# Patient Record
Sex: FEMALE | Race: AMERICAN INDIAN OR ALASKA NATIVE
[De-identification: names, ages, dates, MRNs, and addresses within clinical notes are randomized per-mention and may not be internally consistent; named-entity substitution may affect disease eponyms.]

---

## 2017-05-31 ENCOUNTER — HOSPITAL ENCOUNTER (EMERGENCY)
Dept: HOSPITAL 5 - ED | Age: 32
Discharge: HOME | End: 2017-05-31
Payer: MEDICARE

## 2017-05-31 VITALS — DIASTOLIC BLOOD PRESSURE: 55 MMHG | SYSTOLIC BLOOD PRESSURE: 96 MMHG

## 2017-05-31 DIAGNOSIS — F17.200: ICD-10-CM

## 2017-05-31 DIAGNOSIS — N30.00: ICD-10-CM

## 2017-05-31 DIAGNOSIS — R55: Primary | ICD-10-CM

## 2017-05-31 LAB
ALBUMIN SERPL-MCNC: 4.2 G/DL (ref 3.9–5)
ALBUMIN/GLOB SERPL: 1.3 %
ALP SERPL-CCNC: 81 UNITS/L (ref 35–129)
ALT SERPL-CCNC: 14 UNITS/L (ref 7–56)
ANION GAP SERPL CALC-SCNC: 21 MMOL/L
BACTERIA #/AREA URNS HPF: (no result) /HPF
BASOPHILS NFR BLD AUTO: 1.2 % (ref 0–1.8)
BILIRUB SERPL-MCNC: 0.2 MG/DL (ref 0.1–1.2)
BILIRUB UR QL STRIP: (no result)
BLOOD UR QL VISUAL: (no result)
BUN SERPL-MCNC: 11 MG/DL (ref 7–17)
BUN/CREAT SERPL: 12.22 %
CALCIUM SERPL-MCNC: 9.2 MG/DL (ref 8.4–10.2)
CHLORIDE SERPL-SCNC: 102.2 MMOL/L (ref 98–107)
CO2 SERPL-SCNC: 22 MMOL/L (ref 22–30)
EOSINOPHIL NFR BLD AUTO: 1.7 % (ref 0–4.3)
GLUCOSE SERPL-MCNC: 75 MG/DL (ref 65–100)
HCT VFR BLD CALC: 38.7 % (ref 30.3–42.9)
HGB BLD-MCNC: 12.3 GM/DL (ref 10.1–14.3)
KETONES UR STRIP-MCNC: (no result) MG/DL
LEUKOCYTE ESTERASE UR QL STRIP: (no result)
MCH RBC QN AUTO: 28 PG (ref 28–32)
MCHC RBC AUTO-ENTMCNC: 32 % (ref 30–34)
MCV RBC AUTO: 86 FL (ref 79–97)
MUCOUS THREADS #/AREA URNS HPF: (no result) /HPF
NITRITE UR QL STRIP: (no result)
PH UR STRIP: 6 [PH] (ref 5–7)
PLATELET # BLD: 279 K/MM3 (ref 140–440)
POTASSIUM SERPL-SCNC: 3.7 MMOL/L (ref 3.6–5)
PROT SERPL-MCNC: 7.5 G/DL (ref 6.3–8.2)
PROT UR STRIP-MCNC: (no result) MG/DL
RBC # BLD AUTO: 4.48 M/MM3 (ref 3.65–5.03)
RBC #/AREA URNS HPF: 1 /HPF (ref 0–6)
SODIUM SERPL-SCNC: 141 MMOL/L (ref 137–145)
URINE DRUGS OF ABUSE NOTE: (no result)
UROBILINOGEN UR-MCNC: < 2 MG/DL (ref ?–2)
WBC # BLD AUTO: 7.1 K/MM3 (ref 4.5–11)
WBC #/AREA URNS HPF: 21 /HPF (ref 0–6)

## 2017-05-31 PROCEDURE — 84703 CHORIONIC GONADOTROPIN ASSAY: CPT

## 2017-05-31 PROCEDURE — 84443 ASSAY THYROID STIM HORMONE: CPT

## 2017-05-31 PROCEDURE — 96374 THER/PROPH/DIAG INJ IV PUSH: CPT

## 2017-05-31 PROCEDURE — 85025 COMPLETE CBC W/AUTO DIFF WBC: CPT

## 2017-05-31 PROCEDURE — 99284 EMERGENCY DEPT VISIT MOD MDM: CPT

## 2017-05-31 PROCEDURE — 93010 ELECTROCARDIOGRAM REPORT: CPT

## 2017-05-31 PROCEDURE — 81001 URINALYSIS AUTO W/SCOPE: CPT

## 2017-05-31 PROCEDURE — 80307 DRUG TEST PRSMV CHEM ANLYZR: CPT

## 2017-05-31 PROCEDURE — 80053 COMPREHEN METABOLIC PANEL: CPT

## 2017-05-31 PROCEDURE — 93005 ELECTROCARDIOGRAM TRACING: CPT

## 2017-05-31 PROCEDURE — 36415 COLL VENOUS BLD VENIPUNCTURE: CPT

## 2017-05-31 PROCEDURE — 84484 ASSAY OF TROPONIN QUANT: CPT

## 2017-05-31 NOTE — EMERGENCY DEPARTMENT REPORT
HPI





- General


Chief Complaint: Syncope


Time Seen by Provider: 05/31/17 18:49





- HPI


HPI: 


This is a 31-year-old Afro-American female presents to the emergency department 

by EMS from home after she has what appears to be a syncopal episode.  She was 

on her way to the bathroom when her  saw her go down unconscious.  

Unknown time unconscious.  There is no report of any seizure like activity.  

The patient denies any illicit drug use or any other medication taken prior to 

this event.  She currently denies any physical complaints other than some 

chronic dental pain.  Her primary care physician is Dr. Gordon.  She denies any 

past medical history.  She has some psychiatric history but says that that is 

not an issue for right now and denies any suicidal or homicidal ideations or 

attempts.  No recent travel or sick contacts at home.








ED Past Medical Hx





- Past Medical History


Hx Hypertension: No


Hx Heart Attack/AMI: No


Hx Congestive Heart Failure: No


Hx Diabetes: No


Hx Deep Vein Thrombosis: No


Hx Pulmonary Embolism: No


Hx Liver Disease: No


Hx Renal Disease: No


Hx Sickle Cell Disease: No


Hx Arthritis: No


Hx Seizures: No


Hx Kidney Stones: No


Hx COPD: No


Hx Tuberculosis: No


Hx Dementia: No


Hx HIV: No


Additional medical history: hx pancreatitis, anemia.  Anxiety





- Surgical History


Hx Coronary Stent: No


Hx Pacemaker: No


Hx Internal Defibrillator: No


Additional Surgical History: csearean x 3, vag mesh surg x 6, back surg; 

hysterectomy 2015





- Social History


Smoking Status: Current Some Day Smoker


Substance Use Type: None





- Medications


Home Medications: 


 Home Medications











 Medication  Instructions  Recorded  Confirmed  Last Taken  Type


 


Acetaminophen/Codeine [Tylenol #3] 1 tab PO Q6H PRN #12 tab 08/15/16  Unknown Rx


 


Penicillin Vk [Veetids TAB] 500 mg PO QID #40 tablet 08/15/16  Unknown Rx


 


Tobramycin 0.3% [Tobrex] 1 drop OD Q8HR #1 bottle 08/15/16  Unknown Rx


 


Nitrofurantoin Mono/M-Cryst 100 mg PO Q12HR #14 capsule 05/31/17  Unknown Rx





[Macrobid CAP]     














ED Review of Systems


ROS: 


Stated complaint: SYNCOPY


Other details as noted in HPI





Comment: All other systems reviewed and negative


Constitutional: denies: chills, fever


Eyes: denies: eye pain, eye discharge, vision change


ENT: denies: ear pain, throat pain


Respiratory: denies: cough, shortness of breath, wheezing


Cardiovascular: syncope.  denies: chest pain


Gastrointestinal: denies: abdominal pain, diarrhea


Genitourinary: denies: urgency, dysuria, discharge


Musculoskeletal: denies: back pain, joint swelling, arthralgia


Skin: denies: rash, lesions


Neurological: denies: headache, weakness, paresthesias





Physical Exam





- Physical Exam


Vital Signs: 


 Vital Signs











  05/31/17 05/31/17





  18:31 18:37


 


Temperature  98.0 F


 


Pulse Rate 78 83


 


Respiratory 16 13





Rate  


 


Blood Pressure 100/61 


 


Blood Pressure  96/54





[Right]  


 


O2 Sat by Pulse 100 100





Oximetry  











Physical Exam: 


GENERAL: The patient is well-developed well-nourished.


HEENT: Normocephalic.  Atraumatic.  Extraocular motions are intact.  Patient 

has moist mucous membranes.  Pupils equal reactive to light bilaterally.  No 

nystagmus.


NECK: Supple.  Trachea is midline.


CHEST/LUNGS: Clear to auscultation.  There is no respiratory distress noted.


HEART/CARDIOVASCULAR: Regular.  There is no tachycardia.  There is no gallop 

rub or murmur.


ABDOMEN: Abdomen is soft, nontender.  Patient has normal bowel sounds.  There 

is no abdominal distention.


SKIN: Skin is warm and dry.


NEURO: The patient is awake, alert, and oriented.  The patient is cooperative.  

The patient has no focal neurologic deficits.  The patient has normal speech.  

Cranial nerves II through XII grossly intact.  No dysmetria.  No pronator drift.


MUSCULOSKELETAL: There is no tenderness or deformity.  There is no limitation 

range of motion.  There is no evidence of acute injury.  Muscle strength 5 out 

of 5 upper and lower extremities bilaterally.








ED Course


 Vital Signs











  05/31/17 05/31/17





  18:31 18:37


 


Temperature  98.0 F


 


Pulse Rate 78 83


 


Respiratory 16 13





Rate  


 


Blood Pressure 100/61 


 


Blood Pressure  96/54





[Right]  


 


O2 Sat by Pulse 100 100





Oximetry  














ED Medical Decision Making





- Lab Data


Result diagrams: 


 05/31/17 19:15





 05/31/17 19:15





- EKG Data


-: EKG Interpreted by Me


EKG shows normal: sinus rhythm, axis, intervals, QRS complexes, ST-T waves


Rate: normal





- EKG Data


When compared to previous EKG there are: previous EKG unavailable


Interpretation: normal EKG





- Medical Decision Making


This is a 31-year-old female presents to the emergency department after she had 

a syncopal episode at home.  She was evaluated with physical exam, labs and 

EKG.  EKG is normal without ST elevation MI, dysrhythmia or ischemia.  Vital 

signs stable throughout her ED course.  Physical exam does not show any focal, 

motor or sensory deficits and she has normal cranial nerves.  Labs are mostly 

unremarkable and do not show any etiology of the patient's symptoms.  Urine 

drug screen is positive for benzodiazepine and marijuana.  The patient is on 

benzodiazepine as treatment for her anxiety.  There were 21 white blood cells 

found in the urine so the patient will be placed on a course of Macrobid for a 

mild urinary tract infection.  She was seen ambulatory in the emergency 

department and appeared stable.  As this was the first syncopal episode in a 

long time and there was no further episodes of passing out or any seizure-like 

activity and there are no neurological deficits, did not feel that the patient 

required CT imaging of the head at this time.  She'll be discharged home to 

follow-up with her primary care doctor, Dr. Gordon, but she has been encouraged 

to return to the emergency department with any further episodes or any acute 

distress.  She understands and agrees to plan.








- Differential Diagnosis


vasovagal, orthostatic hypotension, seizure, substance abuse


Critical Care Time: No


Critical care attestation.: 


If time is entered above; I have spent that time in minutes in the direct care 

of this critically ill patient, excluding procedure time.








ED Disposition


Clinical Impression: 


Syncope


Qualifiers:


 Syncope type: unspecified Qualified Code(s): R55 - Syncope and collapse





UTI (urinary tract infection)


Qualifiers:


 Urinary tract infection type: acute cystitis Hematuria presence: without 

hematuria Qualified Code(s): N30.00 - Acute cystitis without hematuria





Disposition: DISCHARGED TO HOME OR SELFCARE


Is pt being admited?: No


Condition: Stable


Instructions:  Urinary Tract Infection in Women (ED), Syncope (ED)


Additional Instructions: 


Please follow-up with your primary care doctor in the next few days.  Return to 

the emergency department with any further episodes of passing out, any seizure-

like activity, or any acute distress.


Prescriptions: 


Nitrofurantoin Mono/M-Cryst [Macrobid CAP] 100 mg PO Q12HR #14 capsule


Referrals: 


LAENY GORDON MD [Referring] - ASAP


Time of Disposition: 22:17

## 2018-05-15 ENCOUNTER — HOSPITAL ENCOUNTER (EMERGENCY)
Dept: HOSPITAL 5 - ED | Age: 33
Discharge: HOME | End: 2018-05-15
Payer: MEDICARE

## 2018-05-15 VITALS — DIASTOLIC BLOOD PRESSURE: 74 MMHG | SYSTOLIC BLOOD PRESSURE: 99 MMHG

## 2018-05-15 DIAGNOSIS — F17.200: ICD-10-CM

## 2018-05-15 DIAGNOSIS — Z86.2: ICD-10-CM

## 2018-05-15 DIAGNOSIS — F41.9: ICD-10-CM

## 2018-05-15 DIAGNOSIS — L30.8: Primary | ICD-10-CM

## 2018-05-15 PROCEDURE — 90471 IMMUNIZATION ADMIN: CPT

## 2018-05-15 PROCEDURE — 90715 TDAP VACCINE 7 YRS/> IM: CPT

## 2018-05-15 PROCEDURE — 99283 EMERGENCY DEPT VISIT LOW MDM: CPT

## 2018-05-15 NOTE — EMERGENCY DEPARTMENT REPORT
ED General Adult HPI





- General


Chief complaint: Skin Rash


Stated complaint: RASH ON CHEST


Time Seen by Provider: 05/15/18 10:50


Source: patient, EMS


Mode of arrival: Stretcher


Limitations: No Limitations





- History of Present Illness


Initial comments: 





32-year-old female history of itchy red rash for 2 days she's not sure if she 

had a contact reaction, she also states she was a heating pad last night and 

now has a blister to the right volar aspect of her arm with a small blister.  

There is no redness or warmth she does have full motion no tightness to the 

skin with soft compartments she is here for 2 things a small partial thickness 

burn less than 1% to the right forearm on the volar aspect #1 #2, a dermatitis 

on under her bilateral breast and also on the chest wall,.  No fever no 

shortness of breath no abdominal complaints.  Past HISTORY significant for 

vasovagal spells.  She also has a history of UTIs.  She denies new medication.  

She denies no detergent.  She shortness of breath no stridor or drooling no 

fever no chest pain no abdominal pain no missed periods no dysuria no back 

pain.  She does have bilateral erythematous macular rash


-: Gradual, hour(s)


Location: chest, right, upper extremity


Radiation: non-radiation


Severity scale (0 -10): 2


Associated Symptoms: denies other symptoms, rash.  denies: chest pain, cough, 

diaphoresis, fever/chills, malaise, nausea/vomiting, seizure, shortness of 

breath, syncope





- Related Data


 Previous Rx's











 Medication  Instructions  Recorded  Last Taken  Type


 


Acetaminophen/Codeine [Tylenol #3] 1 tab PO Q6H PRN #12 tab 08/15/16 Unknown Rx


 


Penicillin Vk [Veetids TAB] 500 mg PO QID #40 tablet 08/15/16 Unknown Rx


 


Tobramycin 0.3% [Tobrex] 1 drop OD Q8HR #1 bottle 08/15/16 Unknown Rx


 


Nitrofurantoin Mono/M-Cryst 100 mg PO Q12HR #14 capsule 05/31/17 Unknown Rx





[Macrobid CAP]    


 


Nystatin 15 gm TP BID #1 cream..g. 05/15/18 Unknown Rx


 


diphenhydrAMINE [Benadryl] 25 mg IV Q6HR PRN #20 vial 05/15/18 Unknown Rx


 


predniSONE [Deltasone] 50 mg PO QDAY 14 Days #11 tab 05/15/18 Unknown Rx











 Allergies











Allergy/AdvReac Type Severity Reaction Status Date / Time


 


No Known Allergies Allergy   Verified 05/15/18 07:14














ED Review of Systems


ROS: 


Stated complaint: RASH ON CHEST


Other details as noted in HPI





Comment: All other systems reviewed and negative


Constitutional: denies: diaphoresis, fever, malaise


Eyes: denies: eye discharge, vision change


ENT: denies: dental pain, hearing loss, epistaxis


Respiratory: denies: cough, orthopnea, shortness of breath, SOB with exertion, 

SOB at rest, stridor, wheezing


Cardiovascular: denies: chest pain, palpitations, dyspnea on exertion, orthopnea

, edema, syncope, paroxysmal nocturnal dyspnea


Gastrointestinal: denies: abdominal pain, nausea, vomiting, diarrhea, 

constipation, hematemesis, melena, hematochezia


Skin: rash, pruritus


Neurological: denies: headache, weakness, numbness, paresthesias, confusion, 

vertigo





ED Past Medical Hx





- Past Medical History


Hx Hypertension: No


Hx Heart Attack/AMI: No


Hx Congestive Heart Failure: No


Hx Diabetes: No


Hx Deep Vein Thrombosis: No


Hx Pulmonary Embolism: No


Hx Liver Disease: No


Hx Renal Disease: No


Hx Sickle Cell Disease: No


Hx Arthritis: Yes


Hx Seizures: No


Hx Kidney Stones: No


Hx COPD: No


Hx Tuberculosis: No


Hx Dementia: No


Hx HIV: No


Additional medical history: hx pancreatitis, anemia.  Anxiety





- Surgical History


Hx Coronary Stent: No


Hx Pacemaker: No


Hx Internal Defibrillator: No


Additional Surgical History: csearean x 3, vag mesh surg x 6, back surg; 

hysterectomy 2015





- Social History


Smoking Status: Current Every Day Smoker


Substance Use Type: None





- Medications


Home Medications: 


 Home Medications











 Medication  Instructions  Recorded  Confirmed  Last Taken  Type


 


Acetaminophen/Codeine [Tylenol #3] 1 tab PO Q6H PRN #12 tab 08/15/16  Unknown Rx


 


Penicillin Vk [Veetids TAB] 500 mg PO QID #40 tablet 08/15/16  Unknown Rx


 


Tobramycin 0.3% [Tobrex] 1 drop OD Q8HR #1 bottle 08/15/16  Unknown Rx


 


Nitrofurantoin Mono/M-Cryst 100 mg PO Q12HR #14 capsule 05/31/17  Unknown Rx





[Macrobid CAP]     


 


Nystatin 15 gm TP BID #1 cream..g. 05/15/18  Unknown Rx


 


diphenhydrAMINE [Benadryl] 25 mg IV Q6HR PRN #20 vial 05/15/18  Unknown Rx


 


predniSONE [Deltasone] 50 mg PO QDAY 14 Days #11 tab 05/15/18  Unknown Rx














ED Physical Exam





- General


Limitations: No Limitations


General appearance: alert, in no apparent distress, anxious





- Head


Head exam: Present: atraumatic, normocephalic





- Eye


Eye exam: Present: normal appearance, PERRL, EOMI





- ENT


ENT exam: Present: normal exam, normal orophraynx





- Neck


Neck exam: Present: normal inspection.  Absent: tenderness, meningismus





- Respiratory


Respiratory exam: Present: normal lung sounds bilaterally.  Absent: respiratory 

distress, wheezes, rales, rhonchi, stridor





- Cardiovascular


Cardiovascular Exam: Present: regular rate, normal rhythm





- GI/Abdominal


GI/Abdominal exam: Present: soft.  Absent: distended, tenderness, guarding, 

rebound, rigid, pulsatile mass





- Extremities Exam


Extremities exam: Present: normal inspection, other (1 cm less than 1% body 

surface area partial thickness burn right forearmcellulitis no compartment 

syndrome forward range of motion neurovascularly intact)





- Neurological Exam


Neurological exam: Present: alert, oriented X3, CN II-XII intact.  Absent: 

motor sensory deficit





- Skin


Skin exam: Present: erythema, other (erythematous bilateral macular rash under 

bilateral breast on the chest wall without cellulitis without soft tissue gas 

lungs are clear auscultation pulses equal bilaterally)





ED Course





 Vital Signs











  05/15/18 05/15/18 05/15/18





  07:07 07:20 08:21


 


Temperature  97.5 F L 


 


Pulse Rate  64 


 


Respiratory 16 16 17





Rate   


 


Blood Pressure  116/50 





[Left]   


 


O2 Sat by Pulse 100 100 





Oximetry   














  05/15/18





  09:24


 


Temperature 98.0 F


 


Pulse Rate 74


 


Respiratory 18





Rate 


 


Blood Pressure 122/61





[Left] 


 


O2 Sat by Pulse 98





Oximetry 














ED Medical Decision Making





- Medical Decision Making





Patient will be given burn cream, no evidence of cellulitis no evidence of deep 

burn, she was updated on tetanus, she also has a contact-like reaction to the 

chest wall and intertriginous areas, she will be placed on nystatin there may 

also be a contact reaction to which she will be given a prednisone as well as 

Benadryl for itch she is still for outpatient follow-up complications were 

noted at this time she will need dermatology follow-up for scraping of 

persistent and/or follow-up she was verbalized understanding 1 I discussed this 

with her.





- Differential Diagnosis


contact dermatitis [place] infection burn


Critical care attestation.: 


If time is entered above; I have spent that time in minutes in the direct care 

of this critically ill patient, excluding procedure time.








ED Disposition


Clinical Impression: 


 Dermatitis, Partial thickness burn





Disposition: DC-01 TO HOME OR SELFCARE


Is pt being admited?: No


Condition: Stable


Instructions:  Partial Thickness Burn (ED), Contact Dermatitis (ED)


Additional Instructions: 


Return if new or alarming symptoms, follow-up and dermatologist your choice in 

2 days, see the doctor listed very regular doctor in 2 days


Prescriptions: 


diphenhydrAMINE [Benadryl] 25 mg IV Q6HR PRN #20 vial


 PRN Reason: Itching


Nystatin 15 gm TP BID #1 cream..g.


predniSONE [Deltasone] 50 mg PO QDAY 14 Days #11 tab


Referrals: 


PRIMARY CARE,MD [Primary Care Provider] - 3-5 Days


CARMELO DAN MD [Staff Physician] - 3-5 Days


Time of Disposition: 11:10

## 2018-08-29 ENCOUNTER — HOSPITAL ENCOUNTER (EMERGENCY)
Dept: HOSPITAL 5 - ED | Age: 33
Discharge: HOME | End: 2018-08-29
Payer: MEDICARE

## 2018-08-29 VITALS — DIASTOLIC BLOOD PRESSURE: 66 MMHG | SYSTOLIC BLOOD PRESSURE: 104 MMHG

## 2018-08-29 DIAGNOSIS — F41.9: ICD-10-CM
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
## 2018-08-29 DIAGNOSIS — F17.200: ICD-10-CM
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
## 2018-08-29 DIAGNOSIS — M19.90: ICD-10-CM

## 2018-08-29 DIAGNOSIS — Z86.2: ICD-10-CM

## 2018-08-29 DIAGNOSIS — S82.832A: Primary | ICD-10-CM

## 2018-08-29 DIAGNOSIS — Z90.710: ICD-10-CM

## 2018-08-29 NOTE — XRAY REPORT
FINAL REPORT



EXAM:  XR ANKLE 3+V LT



HISTORY:  trauma pain  



TECHNIQUE:  AP, lateral, and oblique views of the left ankle



PRIORS:  None.



FINDINGS:  

There is an acute oblique minimally displaced comminuted fracture

involving the distal fibula. The fracture line does extend to the

distal articular surface at the distal tibiofibular joint.

Overlying soft tissue swelling is seen. 



There is no evidence for dislocation.  No radiopaque foreign

bodies are seen.  The ankle mortise is intact.  Bony

mineralization is normal and joint spaces are maintained.



IMPRESSION:  

Minimally displaced acute oblique comminuted fracture involving

the distal fibula.

## 2018-08-29 NOTE — EMERGENCY DEPARTMENT REPORT
ED Lower Extremity HPI





- General


Chief Complaint: Extremity Injury, Lower


Stated Complaint: LEFT ANKLE PAIN


Time Seen by Provider: 08/29/18 17:10


Source: patient


Mode of arrival: Wheelchair


Limitations: Physical Limitation





- History of Present Illness


Initial Comments: 





This is a 32-year-old female nontoxic, well nourished in appearance, no acute 

signs of distress presents to the ED with c/o of left knee pain 1 day.  

Patient stated that she tripped and twisted her ankle.  Patient denies any 

other trauma.  Patient denies any numbness, tingling, fever, chills, nausea, 

vomiting, chest pain, shortness of breath, headache, stiff neck.  Patient 

denies any joint swelling or joint redness.  Patient denies decreased range of 

motion.  Patient stated has decreased gait due to pain.  Patient denies any 

allergies or significant past medical history.


MD Complaint: ankle injury


-: days(s) (1)


Injury: Ankle: Left


Type of Injury: inversion


Place: home


Severity: mild


Severity scale (0 -10): 8


Improves With: immobilization


Worsens With: weight bearing, movement, palpation


Associated Symptoms: swelling, unable to bear weight.  denies: snap/pop 

sensation, numbness, tingling, able to partially bear weight, ambulatory





- Related Data


 Previous Rx's











 Medication  Instructions  Recorded  Last Taken  Type


 


Acetaminophen/Codeine [Tylenol #3] 1 tab PO Q6H PRN #12 tab 08/15/16 Unknown Rx


 


Penicillin Vk [Veetids TAB] 500 mg PO QID #40 tablet 08/15/16 Unknown Rx


 


Tobramycin 0.3% [Tobrex] 1 drop OD Q8HR #1 bottle 08/15/16 Unknown Rx


 


Nitrofurantoin Mono/M-Cryst 100 mg PO Q12HR #14 capsule 05/31/17 Unknown Rx





[Macrobid CAP]    


 


Nystatin 15 gm TP BID #1 cream..g. 05/15/18 Unknown Rx


 


diphenhydrAMINE [Benadryl] 25 mg IV Q6HR PRN #20 vial 05/15/18 Unknown Rx


 


predniSONE [Deltasone] 50 mg PO QDAY 14 Days #11 tab 05/15/18 Unknown Rx


 


Acetaminophen/Codeine [Tylenol 1 tab PO Q6H PRN #12 tab 08/29/18 Unknown Rx





/Codeine # 3 tab]    











 Allergies











Allergy/AdvReac Type Severity Reaction Status Date / Time


 


No Known Allergies Allergy   Verified 05/15/18 07:14














ED Review of Systems


ROS: 


Stated complaint: LEFT ANKLE PAIN


Other details as noted in HPI





Constitutional: denies: chills, fever


Eyes: denies: eye pain, eye discharge, vision change


ENT: denies: ear pain, throat pain


Respiratory: denies: cough, shortness of breath, wheezing


Cardiovascular: denies: chest pain, palpitations


Endocrine: no symptoms reported


Gastrointestinal: denies: abdominal pain, nausea, diarrhea


Genitourinary: denies: urgency, dysuria, discharge


Musculoskeletal: denies: back pain, joint swelling, arthralgia


Skin: denies: rash, lesions


Neurological: denies: headache, weakness, paresthesias


Psychiatric: denies: anxiety, depression


Hematological/Lymphatic: denies: easy bleeding, easy bruising





ED Past Medical Hx





- Past Medical History


Previous Medical History?: Yes


Hx Hypertension: No


Hx Heart Attack/AMI: No


Hx Congestive Heart Failure: No


Hx Diabetes: No


Hx Deep Vein Thrombosis: No


Hx Pulmonary Embolism: No


Hx Liver Disease: No


Hx Renal Disease: No


Hx Sickle Cell Disease: No


Hx Arthritis: Yes


Hx Seizures: No


Hx Kidney Stones: No


Hx COPD: No


Hx Tuberculosis: No


Hx Dementia: No


Hx HIV: No


Additional medical history: hx pancreatitis, anemia.  Anxiety





- Surgical History


Past Surgical History?: Yes


Hx Coronary Stent: No


Hx Pacemaker: No


Hx Internal Defibrillator: No


Additional Surgical History: csearean x 3, vag mesh surg x 6, back surg; 

hysterectomy 2015





- Social History


Smoking Status: Current Every Day Smoker


Substance Use Type: None





- Medications


Home Medications: 


 Home Medications











 Medication  Instructions  Recorded  Confirmed  Last Taken  Type


 


Acetaminophen/Codeine [Tylenol #3] 1 tab PO Q6H PRN #12 tab 08/15/16  Unknown Rx


 


Penicillin Vk [Veetids TAB] 500 mg PO QID #40 tablet 08/15/16  Unknown Rx


 


Tobramycin 0.3% [Tobrex] 1 drop OD Q8HR #1 bottle 08/15/16  Unknown Rx


 


Nitrofurantoin Mono/M-Cryst 100 mg PO Q12HR #14 capsule 05/31/17  Unknown Rx





[Macrobid CAP]     


 


Nystatin 15 gm TP BID #1 cream..g. 05/15/18  Unknown Rx


 


diphenhydrAMINE [Benadryl] 25 mg IV Q6HR PRN #20 vial 05/15/18  Unknown Rx


 


predniSONE [Deltasone] 50 mg PO QDAY 14 Days #11 tab 05/15/18  Unknown Rx


 


Acetaminophen/Codeine [Tylenol 1 tab PO Q6H PRN #12 tab 08/29/18  Unknown Rx





/Codeine # 3 tab]     














ED Physical Exam





- General


Limitations: Physical Limitation


General appearance: alert, in no apparent distress





- Head


Head exam: Present: atraumatic, normocephalic





- Eye


Eye exam: Present: normal appearance


Pupils: Present: normal accommodation





- ENT


ENT exam: Present: normal exam, mucous membranes moist





- Neck


Neck exam: Present: normal inspection, full ROM.  Absent: tenderness, 

meningismus, lymphadenopathy





- Respiratory


Respiratory exam: Present: normal lung sounds bilaterally.  Absent: respiratory 

distress, wheezes, rales, rhonchi, stridor, chest wall tenderness, accessory 

muscle use, decreased breath sounds, prolonged expiratory





- Cardiovascular


Cardiovascular Exam: Present: regular rate, normal rhythm, normal heart sounds.

  Absent: irregular rhythm, systolic murmur, diastolic murmur, rubs, gallop





- GI/Abdominal


GI/Abdominal exam: Present: soft, normal bowel sounds.  Absent: distended, 

tenderness, guarding, rebound, rigid, diminished bowel sounds





- Rectal


Rectal exam: Present: deferred





- Extremities Exam


Extremities exam: Present: normal inspection, full ROM, tenderness, normal 

capillary refill.  Absent: joint swelling





- Expanded Lower Extremity Exam


  ** Left


Hip exam: Present: normal inspection, full ROM.  Absent: tenderness, swelling


Upper Leg exam: Present: normal inspection, full ROM.  Absent: tenderness, 

swelling


Knee exam: Present: normal inspection, full ROM.  Absent: tenderness, swelling


Lower Leg exam: Present: normal inspection, full ROM.  Absent: tenderness, 

swelling


Ankle exam: Present: normal inspection, full ROM, tenderness, swelling, 

ecchymosis.  Absent: abrasion, laceration, deformity, crepidus, dislocation, 

erythema, anterior draw sign


Foot/Toe exam: Present: normal inspection, full ROM.  Absent: tenderness, 

swelling, abrasion, laceration, ecchymosis, deformity, crepidus, dislocation, 

erythema, amputation, puncture wound, foreign body, calcaneal tenderness, 

tenderness at base of 5th metatarsal, nail avulsion, subungual hematoma


Neuro vascular tendon exam: Present: no vascular compromise.  Absent: pulse 

deficit, abnormal cap refill, motor deficit, sensory deficit, tendon deficit, 

extremity cold to touch, pallor, abnormal 2-point discrimination, decreased fine

/light touch, foot drop, peroneal nerve deficit, significant pain with passive 

ROM of distal joint


Gait: Positive: unable to bear weight





- Back Exam


Back exam: Present: normal inspection, full ROM





- Neurological Exam


Neurological exam: Present: alert, oriented X3, normal gait





- Psychiatric


Psychiatric exam: Present: normal affect, normal mood





- Skin


Skin exam: Present: warm, dry, intact, normal color.  Absent: rash





ED Course





 Vital Signs











  08/29/18





  15:55


 


Temperature 98.4 F


 


Pulse Rate 115 H


 


Respiratory 18





Rate 


 


Blood Pressure 104/66


 


O2 Sat by Pulse 99





Oximetry 














- Reevaluation(s)


Reevaluation #1: 





08/29/18 17:26


Patient is speaking in full sentences with no signs of distress noted.





ED Lower Extremity MDM





- Medical Decision Making





This is a 32-year-old female that presents with left fibular fracture.  Patient 

is stable and was examined by me.  I referred patient to an orthopedic doctor 

for further evaluation for possible MRI.  X-ray has been obtained and dictated 

by the radiologist.  Patient is notified of the x-ray report with noted by the 

patient.  Not warm to touch. No signs of cellulites present. Patient received 

shelley posterior short leg splint and crutches and was educated by RN how to 

a use crutches. Post splint assessment: neurovasular intact; normal cap refill <

2 second; normal sensation; denies decreaed sensation; normal ROM of digits. 

Patient was instructed to RICE therapy.  Patient received Tylenol for pain.  

Patient is discharged with Tylenol with codeine. At time of discharge, the 

patient does not seem toxic or ill in appearance.  No acute signs of distress 

noted.  Patient agrees to discharge treatment plan of care.  No further 

questions noted by the patient.


Critical care attestation.: 


If time is entered above; I have spent that time in minutes in the direct care 

of this critically ill patient, excluding procedure time.








ED Disposition


Clinical Impression: 


Left fibular fracture


Qualifiers:


 Encounter type: initial encounter Fibula location: distal Fracture type: 

closed Fracture morphology: unspecified fracture morphology Qualified Code(s): 

S82.832A - Other fracture of upper and lower end of left fibula, initial 

encounter for closed fracture





Disposition: DC-01 TO HOME OR SELFCARE


Is pt being admited?: No


Does the pt Need Aspirin: No


Condition: Stable


Instructions:  Ankle Fracture (ED), RICE Therapy (ED), Acetaminophen/Codeine (

By mouth), Splint Care (ED), Crutch Instructions (ED)


Additional Instructions: 


Follow-up with a orthopedic primary care doctor in 3-5 days or if symptoms 

worsen and continue return to emergency room as soon as possible. 


Do not operate any machinery while taking Tylenol with codeine as this may 

cause drowsiness.


Prescriptions: 


Acetaminophen/Codeine [Tylenol /Codeine # 3 tab] 1 tab PO Q6H PRN #12 tab


 PRN Reason: Pain , Severe (7-10)


Referrals: 


TRINIDAD VILLAR MD [Staff Physician] - 3-5 Days


PRIMARY CARE,MD [Referring] - 3-5 Days


[facility] [Outside] - 3-5 Days


Forms:  Work/School Release Form(ED)